# Patient Record
Sex: MALE | Race: WHITE | URBAN - METROPOLITAN AREA
[De-identification: names, ages, dates, MRNs, and addresses within clinical notes are randomized per-mention and may not be internally consistent; named-entity substitution may affect disease eponyms.]

---

## 2017-01-17 ENCOUNTER — TELEPHONE (OUTPATIENT)
Dept: GASTROENTEROLOGY | Facility: OUTPATIENT CENTER | Age: 27
End: 2017-01-17

## 2017-01-17 NOTE — TELEPHONE ENCOUNTER
Patient taking any blood thinners ? no    Heart disease ? denies    Lung disease ? denies      Sleep apnea ? denies    Diabetic ? denies    Kidney disease ? denies    Dialysis ? n/a    Electronic implanted medical devices ? denies    Are you taking any narcotic pain medication ? no  What is your daily dosage ?    PTSD ? n/a    Prep instructions reviewed with patient ? Instructions,  policy, MAC sedation plan reviewed. Advised pt. To have someone stay with him post exam    Pharmacy : n/a    Indication for procedure : Rectal bleeding    Referring provider : Carmel Ferreira

## 2017-01-20 ENCOUNTER — TRANSFERRED RECORDS (OUTPATIENT)
Dept: HEALTH INFORMATION MANAGEMENT | Facility: CLINIC | Age: 27
End: 2017-01-20

## 2017-03-17 ENCOUNTER — OFFICE VISIT (OUTPATIENT)
Dept: ONCOLOGY | Facility: CLINIC | Age: 27
End: 2017-03-17
Attending: GENETIC COUNSELOR, MS
Payer: MEDICAID

## 2017-03-17 DIAGNOSIS — D12.6 BENIGN NEOPLASM OF COLON, UNSPECIFIED PART OF COLON: Primary | ICD-10-CM

## 2017-03-17 PROCEDURE — 96040 ZZH GENETIC COUNSELING, EACH 30 MINUTES: CPT | Mod: ZF | Performed by: GENETIC COUNSELOR, MS

## 2017-03-17 NOTE — PATIENT INSTRUCTIONS
Assessing Cancer Risk  Only about 5-10% of cancers are thought to be due to an inherited cancer susceptibility gene.    These families often have:    Several people with the same or related types of cancer    Cancers diagnosed at a young age (before age 50)    Individuals with more than one primary cancer    Multiple generations of the family affected with cancer    MUTYH Associated Polyposis (MAP)  MAP is a hereditary condition that causes the formation of adenoma type polyps (typically 10 to more than 100) in the colon, the first part of the small intestine, and the stomach.  On average, individuals affected with MAP usually develop these precancerous polyps around age 50.      MUTYH  Gene  We each inherit two copies of every gene in our bodies: one from our mother, and one from our father.  Each gene has a specific job to do.  When a gene has a mistake or  mutation  in it, it does not work like it should.  Everyone has two copies of the MUTYH gene.  In order to be affected with MAP, a person must inherit a mutation in both copies of the gene.  This causes the formation of polyps and increases cancer risk.  The table below shows the chance that someone with MAP would develop cancer in their lifetime1,2.      Lifetime Cancer Risks   Colon %   Duodenal (small intestine) 5%     Studies suggest that there may also be increased risk for other later-onset cancers including ovarian and bladder cancer in individuals with MAP.  Currently, it is thought that carriers of MAP do have a colon cancer risk 2-3 times greater than the general population, but this risk does not seem to be for early-onset colon cancer.      Other Findings  Individuals with MAP may also show these features:    CHRPE: freckle like spots on the inside of the eye    Dental abnormalities/cysts on the jaw bone    Benign and malignant tumors of the skin s oil glands (sebaceous tumors)     Inheritance   MUTYH mutations are inherited in an autosomal  recessive pattern.  This means that if a both parents have a mutation, each of that couple s children will have a 25% chance of being affected with MAP.  Likewise, each child would have a 25% chance of having no MUTYH mutations and a 50% chance of having only a single mutation.  People with only one mutation are called  carriers  and are NOT affected with MAP.              Image obtained from Genetics Home Reference, 2013    Genetic Testing  Genetic testing involves a simple blood test and will look at the genetic information in the MUTYH gene for any harmful mutations that are associated with increased risk for polyps and cancer.  If possible, it is recommended that the person(s) who has had polyps or cancer be tested before other family members.  That person will give us the most useful information about whether or not a specific gene is associated with the cancer in the family.  MUTYH testing can be ordered in several ways including looking for only the two common  mutations or looking at the entire length of the gene.  Your genetic counselor can help determine which test is most appropriate for you and your family.     Results  There are three possible results of MUTYH genetic testing:    Positive--two harmful MUTYH mutations were identified    Carrier--only one harmful MUTYH mutation was identified    Negative--no MUTYH mutations were identified    Variant of unknown significance--a variation in the gene was identified, but it is unclear how this impacts cancer risk in the family    Advantages and Disadvantages  There are advantages and disadvantages to genetic testing of this gene.    Advantages    May clarify your cancer risk    Can help you make medical decisions    May explain the polyps and cancers in your family    May give useful information to your family members (if you share your results)    Disadvantages    Possible negative emotional impact of learning about inherited cancer  risk    Uncertainty in interpreting a negative test result in some situations    Possible genetic discrimination concerns (see below)        Reducing Cancer Risk  Current screening recommendations for people with MAP include2:    Colon:   o Colonoscopy starting at age 25-30; repeated every 2-3 years if no polyps are found; repeated every 1-2 years if fewer than 20 polyps are found and they are small    o Consider removal of the colon if more than 20 polyps are found at any one colonoscopy or all of the polyps cannot be removed during the colonoscopy    Duodenum and Stomach:   o Baseline upper endoscopy with side-viewing beginning at age 30-35      Even after the colon is removed, the rectum should be evaluated every 6-12 months for polyps.    Some medications are available that may reduce the number of polyps.    Guidelines do not yet encourage screening for other cancers, but skin and thyroid exams could be done as part of the annual physical exam.    Carriers of MAP (one MUTYH mutation) should have average moderate-risk colon cancer screening.     Genetic Information Nondiscrimination Act (GERONIMO)  GERONIMO is a federal law that protects individuals from health insurance or employment discrimination based on a genetic test result alone.  Although rare, there are currently no legal protections in terms of life insurance, long term care, or disability insurances.  Visit the National Human Genome Research [place] at Genome.gov/44563492 to learn more.    Questions to Think About Regarding Genetic Testing    What effect will the test result have on me and my relationship with my family members if I have an inherited gene mutation?  If I don t have a gene mutation?    Should I share my test results, and how will my family react to this news, which may also affect them?    Are my children ready to learn new information that may one day affect their own health?    Resources  Colorectal Cancer Coalition  [email or web address]   Colon Cancer [place] (CCA) ccalliance.org   American Cancer Society (ACS) cancer.org   National Cancer [place] (NCI) cancer.gov     Please call us if you have any questions or concerns.     Cancer Risk Management Program [phone]-CANCER ([phone])    ? Penny Lopez, MS, [facility] [place]  [phone]  ? Monet Malone, MS, [facility] [place]  [phone]  ? Violeta Christiano, MS, [facility] [place]  [phone]  ? Cami Arevalo, MS, [facility] [place]  [phone]    References  1. Charo SJ, Olga Campo MC, Thong IP, Coni RS. Clinical implications of the colorectal cancer risk associated with MUTYH mutation. J Clin Oncol. 2009;27:3975-80.  2. National Comprehensive Cancer Network. Clinical practice guidelines in oncology, colorectal cancer screening. Available online (registration required). 2014.    Assessing Cancer Risk  Only about 5-10% of cancers are thought to be due to an inherited cancer susceptibility gene.    These families often have:    Several people with the same or related types of cancer    Cancers diagnosed at a young age (before age 50)    Individuals with more than one primary cancer    Multiple generations of the family affected with cancer    Familial Adenomatous Polyposis (FAP)  FAP is a hereditary condition that causes the formation of many (more than 100) adenoma type polyps in the colon and increases the risk of colon cancer. Individuals affected with FAP can begin to develop these precancerous polyps in their teens. Without preventative surgery, colon cancer is almost inevitable.     Attenuated Familial Adenomatous Polyposis (AFAP)  AFAP is also a condition that causes colon polyps. It is milder than Classic FAP, however, and the lifetime risk of colon cancer is lower at approximately 70%.  Usually individuals affected with AFAP will have  adenoma type polyps.     FAP and AFAP may also be known as Moran Syndrome or Turcot Syndrome.    APC Gene  We each inherit two copies of every gene in our bodies: one  from our mother, and one from our father.  Each gene has a specific job to do.  When a gene has a mistake or  mutation  in it, it does not work like it should.  Everyone has two copies of the APC gene.  A single mutation in one of the copies of the APC gene causes FAP or AFAP.  This causes the formation of polyps and increases colon cancer risk.  The risk for other types of cancers including gastric, pancreatic, and certain brain tumors is also slightly increased.   The table below shows the chance that someone with an APC mutation would develop cancer in their lifetime1,2.     Lifetime Cancer Risks   Colon %   Duodenal 4-12%   Thyroid <2%   Liver (before age 5) 1-2%   Pancreatic <1%   Stomach <1%     Other Findings  Individuals with FAP may also show these features:    Polyps in the stomach and small bowel    CHRPE: freckle like spots on the inside of the eye    Desmoid Tumors: benign tumors typically found in the abdomen    Osteomas: benign bony tumors typically found in the skull and/or jaw    Epidermoid cysts: benign cysts found on the skin    Extra teeth or other dental abnormalities     Inheritance   APC mutations are inherited in an autosomal dominant pattern.  This means that if a parent has a mutation, each of his or her children will have a 50% chance of inheriting that same mutation.  Therefore, each child--male or female--would have a 50% chance of being at increased risk for developing colon polyps and cancer.  Up to 30% of people with an APC mutation, however, did not inherit it from a parent.  Rather, the mutation occurred de jef (for the first time) in them.  Now that they have the mutation, however, they can pass it on to their children.              Image obtained from Genetics Home Reference, 2013    Genetic Testing  Genetic testing involves a simple blood test and will look at the genetic information in the APC gene for any harmful mutations that are associated with increased risk for  polyps and cancer.  If possible, it is recommended that the person(s) who has had polyps or cancer be tested before other family members.  That person will give us the most useful information about whether or not a specific gene is associated with the cancer in the family.     Results  There are three possible results of APC genetic testing:    Positive--a harmful mutation was identified    Negative--no mutation was identified    Variant of unknown significance--a variation in the gene was identified, but it is unclear how this impacts cancer risk in the family    Advantages and Disadvantages  There are advantages and disadvantages to genetic testing of this gene.    Advantages    May clarify your cancer risk    Can help you make medical decisions    May explain the polyps and cancers in your family    May give useful information to your family members (if you share your results)    Disadvantages    Possible negative emotional impact of learning about inherited cancer risk    Uncertainty in interpreting a negative test result in some situations    Possible genetic discrimination concerns (see below)        Reducing Cancer Risk  Current screening recommendations for people with Classic FAP include1:    Colon:   o Annual colonoscopy or sigmoidoscopy starting at age 10-15 years  o Removal of the colon with continued rectal surveillance as needed    Duodenum and Stomach:   o Baseline upper endoscopy including side-viewing starting at age 25-30; repeated based on polyp findings3  o Consider adding small bowel visualization to CT or MRI done for desmoids (below)    Thyroid:    o Annual thyroid exam starting in late teens  o Consider annual thyroid ultrasound    CNS: Annual physical exam    Liver:   o Liver palpation, abdominal ultrasound, and AFP measurement; repeated every 3-6 months until age 5    Desmoids:   o Annual abdominal palpation    o Consider abdominal MRI or CT 1-3 years after colectomy; repeated every 5-10  years    Current screening recommendations for individuals with Attenuated FAP (AFAP) include1:    Colon:   o Colonoscopy starting at late teens; repeated every 2-3  o Consider removal of the colon when more than 20 or 30 adenoma polyps are found    Duodenum and Stomach:   o Baseline upper endoscopy starting at age 25-30; repeated based on findings    Thyroid:  Annual thyroid exam     CNS: Annual physical exam    Some medications are available that may reduce the number of polyps.  Osteomas, desmoids, and epidermoid cysts should be evaluated for treatment or removal.    The age at which to start and repeat screening may be modified based on personal and family history.    Genetic Information Nondiscrimination Act (GERONIMO)  GERONIMO is a federal law that protects individuals from health insurance or employment discrimination based on a genetic test result alone.  Although rare, there are currently no legal protections in terms of life insurance, long term care, or disability insurances.  Visit the National [employer] Research [place] at Genome.gov/25031237 to learn more.    Questions to Think About Regarding Genetic Testing    What effect will the test result have on me and my relationship with my family members if I have an inherited gene mutation?  If I don t have a gene mutation?    Should I share my test results, and how will my family react to this news, which may also affect them?    Are my children ready to learn new information that may one day affect their own health?    Resources  Colorectal Cancer Coalition [email or web address]   Colon Cancer [place] (CCA) [email or web address]   American Cancer Society (ACS) cancer.org   National Cancer [place] (NCI) cancer.gov     Please call us if you have any questions or concerns.     Cancer Risk Management Program [phone] ([phone])    ? Pneny Lopez MS, [facility] [place]  [phone]  ? Monet Malone MS, [facility] [place]  [phone]  ? Violeta Alvarado MS,  [facility] [place] [facility]  [phone]  ? Cami Arevalo, MS, [facility] [place] [facility]  [phone]    References  3. National Comprehensive Cancer Network. Clinical practice guidelines in oncology, colorectal cancer screening. Available online (registration required). 2014.  4. Mariola BURGESS, Tarik J, Jay KM, Zaire RA, Shelbi N, Kim J, Lorraine G, Rain MF, [place] RW. American  mutation for attenuated familial adenomatous polyposis. Clin Gastroenterol Hepatol. 2008;6:46-52.  5. National Comprehensive Cancer Network. Gastric Cancer. Available online (registration required). 2015.

## 2017-03-17 NOTE — LETTER
3/17/2017       RE: Bryan Mora  [address] [facility] [address]     Dear Colleague,    Thank you for referring your patient, Bryan Mora, to the [facility]. Please see a copy of my visit note below.    3/17/2017    Referring Provider: Braxton Almanza MD    Presenting Information:   I met with Bryan Mora today for genetic counseling at the Cancer Risk Management Program at the [facility] to discuss his personal history of colon polyps. He is here today to review this history, cancer screening recommendations, and available genetic testing options.    Personal History:  Bryan is a 26 year old male. He does not have any personal history of cancer. Bryan recently had his first colonoscopy at age 26 to address rectal bleeding. This colonoscopy in January 2017 identified one 1.4 cm sessile serrate adenoma in the ascending colon, one 0.5 cm sessile serrated adenoma in the transverse colon, and one 1.6 cm hyperplastic polyp in the sigmoid colon. Follow-up was recommended in one year. He does not regularly do any other cancer screening at this time. Bryan smoked one pack of cigarettes a day for eight years and quit in 2013. He reports very minimal alcohol use.    Family History: (Please see scanned pedigree for detailed family history information)    One maternal half sister is 40 and had less than five colon polyps of unknown type removed in her 20's. She then had annual colonoscopies for several years and no other polyps were identified. One of her daughters is experiencing gastrointestinal problems, particularly constipation, but no cause has been identified.    One maternal half sister is 44 and has no personal history of colon polyps or cancer. She reportedly carries a Factor V Leiden mutation. We reviewed autosomal dominant inheritance and that Bryan has a 25% chance to carry this mutation associated with clotting risk. Bryan is encouraged to discuss this history with  "his primary care provider.    Bryan's mother is 61 and has had several \"precancerous\" breast masses removed.     Bryan's maternal grandmother was diagnosed with breast cancer at age 60 and passed away at age 63; treatment included chemotherapy.    Bryan's paternal grandfather was diagnosed with and passed away from lung cancer in his 50's. He had a history of smoking.    His maternal ethnicity is English and [place]. His paternal ethnicity is [place], Sinhala, and [place]ian. There is no known Ashkenazi Faith ancestry on either side of his family. There is no reported consanguinity.    Discussion:    Bryan's personal history of colon polyps is not clearly suggestive of a hereditary cancer/polyposis syndrome at this time, though it is important to note his young age and the larger size of the polyps removed.    We reviewed the features of sporadic, familial, and hereditary cancers. In looking at Bryan's family history, it is still possible that a cancer susceptibility gene is present as Bryan was found to have three polyps (two of which are 1 cm/advanced polyps) at age 26. His maternal half sister may also have a similar history. It is important to note, though, that Bryan otherwise has no other relatives diagnosed with polyps/related cancers (i.e. colon, gastrointestinal cancers) and the relatives diagnosed with cancer were diagnosed at more typical ages and with cancers associated with significant environmental exposures (i.e. smoking and lung cancer).     We discussed the natural history and genetics of MUTYH-Associated Polyposis (MAP) and Familial Adenomatous Polyposis (FAP). A detailed handout regarding these syndromes and the information we discussed was provided to Bryan at the end of our appointment today and can be found in the after visit summary.  Topics included: inheritance pattern, cancer risks, cancer screening recommendations, and also risks, benefits and limitations of testing.    We reviewed that a " possible explanation for his personal history of colon polyps is MAP, which is caused by mutations in the MUTYH gene. Individuals with MAP typically have between  (or more) colon polyps and are at increased risk for colon and duodenal cancer. We reviewed that his personal history is not typical for someone with MAP, as he has only had three colon polyps. That being said, he is young for having colon polyps, which would be consistent with MAP. As he has no family history of colon cancer or polyps, this may also be consistent with the autosomal recessive inheritance of this condition (i.e. parents are carriers and are not expected to have the same colon polyp and cancer risks).    Another potential explanation could be FAP, which is caused by mutations in the APC gene. Individuals with FAP typically have hundreds to thousands of colon polyps and a nearly 100% lifetime risk for colon cancer (if left untreated). There are also increased risks for small bowel, stomach, thyroid, and brain cancers. A similar condition, called Attenuated FAP (AFAP), is also caused by mutations in the APC gene and presents with risks for the same polyps and cancers, but the risks are not typically as high. Bryan's personal and family history is not typical for FAP, but may be more consistent with AFAP given his lower polyp count (though still less likely given the number and type of polyps removed). Also, a small percentage of individuals with APC mutations are the first members of their family to carry that gene mutation, which can explain an otherwise negative family history.       We briefly reviewed that there are other hereditary polyp conditions, but they typically present with other types of colon polyps (juvenile, hamartomatous, etc.) that Bryan was not found to carry. This makes the other polyp conditions less likely.    Bryan explained that though there is a small likelihood that he has one of these conditions, he would be  interesting in genetic testing of the APC and MUTYH genes to rule out these potential cancer risks and to better understand his cancer screening recommendations.    Bryan further explained that he would prefer to obtain a pre-authorization for the genetic testing through his insurance company before having his blood drawn. As such, his insurance information will be provided to [employer] in order to initiate the pre-authorization process. Bryan will then be contacted with his expected insurance coverage and estimated out of pocket cost, when available. If he is comfortable with this coverage and cost, he will be invited back to clinic to have his blood drawn for testing. Bryan was in agreement with this plan and was provided my contact information in the event he has questions.    The information from genetic testing may determine additional cancer screening recommendations (i.e. frequent colonoscopies, upper endoscopies, etc.) as well as options for risk reducing surgeries for Bryan and his relatives. These recommendations will be discussed in detail once genetic testing is completed.    Plan:  1) Today Bryan elected to pursue a pre-authorization for genetic testing of the APC and MUTYH genes through [employer] in order to better understand his expected insurance coverage and estimated out of pocket cost.  2) Bryan will be contacted with this information, once obtained. If he is comfortable with his estimated out of pocket cost, he will be invited back to clinic for a blood draw.  3) Bryan was provided my contact information in the event he has any questions.    Face to face time: 35 minutes    Violeta Alvarado MS, [facility] [place]  Certified Genetic Counselor  Office: [phone]  Pager: [phone]

## 2017-03-17 NOTE — LETTER
Cancer Risk Management  Program Locations    [facility]  [facility]  [facility]  Mailing Address  Cancer Risk Management Program  [facility]  [address]  [place] [address]    New patient appointments  [phone]  March 20, 2017    Bryan Mora  [address] [facility] [address]      Dear Bryan,    It was a pleasure meeting with you at the [facility] on March 17, 2017. Here is a copy of the progress note from your recent genetic counseling visit to the Cancer Risk Management Program. If you have any additional questions, please feel free to call.    3/17/2017    Referring Provider: Braxton Almanza MD    Presenting Information:   I met with Bryan Mora today for genetic counseling at the Cancer Risk Management Program at the [facility] to discuss his personal history of colon polyps. He is here today to review this history, cancer screening recommendations, and available genetic testing options.    Personal History:  Bryan is a 26 year old male. He does not have any personal history of cancer. Bryan recently had his first colonoscopy at age 26 to address rectal bleeding. This colonoscopy in January 2017 identified one 1.4 cm sessile serrate adenoma in the ascending colon, one 0.5 cm sessile serrated adenoma in the transverse colon, and one 1.6 cm hyperplastic polyp in the sigmoid colon. Follow-up was recommended in one year. He does not regularly do any other cancer screening at this time. Bryan smoked one pack of cigarettes a day for eight years and quit in 2013. He reports very minimal alcohol use.    Family History: (Please see scanned pedigree for detailed family history information)    One maternal half sister is 40 and had less than five colon polyps of unknown type removed in her  "20's. She then had annual colonoscopies for several years and no other polyps were identified. One of her daughters is experiencing gastrointestinal problems, particularly constipation, but no cause has been identified.    One maternal half sister is 44 and has no personal history of colon polyps or cancer. She reportedly carries a Factor V Leiden mutation. We reviewed autosomal dominant inheritance and that Bryan has a 25% chance to carry this mutation associated with clotting risk. Bryan is encouraged to discuss this history with his primary care provider.    Bryan's mother is 61 and has had several \"precancerous\" breast masses removed.     Bryan's maternal grandmother was diagnosed with breast cancer at age 60 and passed away at age 63; treatment included chemotherapy.    Bryan's paternal grandfather was diagnosed with and passed away from lung cancer in his 50's. He had a history of smoking.    His maternal ethnicity is English and Syrian. His paternal ethnicity is Jody, Mongolian, and Romanian. There is no known Ashkenazi Jain ancestry on either side of his family. There is no reported consanguinity.    Discussion:    Bryan's personal history of colon polyps is not clearly suggestive of a hereditary cancer/polyposis syndrome at this time, though it is important to note his young age and the larger size of the polyps removed.    We reviewed the features of sporadic, familial, and hereditary cancers. In looking at Bryan's family history, it is still possible that a cancer susceptibility gene is present as Bryan was found to have three polyps (two of which are 1 cm/advanced polyps) at age 26. His maternal half sister may also have a similar history. It is important to note, though, that Bryan otherwise has no other relatives diagnosed with polyps/related cancers (i.e. colon, gastrointestinal cancers) and the relatives diagnosed with cancer were diagnosed at more typical ages and with cancers associated with " significant environmental exposures (i.e. smoking and lung cancer).     We discussed the natural history and genetics of MUTYH-Associated Polyposis (MAP) and Familial Adenomatous Polyposis (FAP). A detailed handout regarding these syndromes and the information we discussed was provided to Bryan at the end of our appointment today and can be found in the after visit summary.  Topics included: inheritance pattern, cancer risks, cancer screening recommendations, and also risks, benefits and limitations of testing.    We reviewed that a possible explanation for his personal history of colon polyps is MAP, which is caused by mutations in the MUTYH gene. Individuals with MAP typically have between  (or more) colon polyps and are at increased risk for colon and duodenal cancer. We reviewed that his personal history is not typical for someone with MAP, as he has only had three colon polyps. That being said, he is young for having colon polyps, which would be consistent with MAP. As he has no family history of colon cancer or polyps, this may also be consistent with the autosomal recessive inheritance of this condition (i.e. parents are carriers and are not expected to have the same colon polyp and cancer risks).    Another potential explanation could be FAP, which is caused by mutations in the APC gene. Individuals with FAP typically have hundreds to thousands of colon polyps and a nearly 100% lifetime risk for colon cancer (if left untreated). There are also increased risks for small bowel, stomach, thyroid, and brain cancers. A similar condition, called Attenuated FAP (AFAP), is also caused by mutations in the APC gene and presents with risks for the same polyps and cancers, but the risks are not typically as high. Bryan's personal and family history is not typical for FAP, but may be more consistent with AFAP given his lower polyp count (though still less likely given the number and type of polyps removed). Also, a  small percentage of individuals with APC mutations are the first members of their family to carry that gene mutation, which can explain an otherwise negative family history.       We briefly reviewed that there are other hereditary polyp conditions, but they typically present with other types of colon polyps (juvenile, hamartomatous, etc.) that Bryan was not found to carry. This makes the other polyp conditions less likely.    Bryan explained that though there is a small likelihood that he has one of these conditions, he would be interesting in genetic testing of the APC and MUTYH genes to rule out these potential cancer risks and to better understand his cancer screening recommendations.    Bryan further explained that he would prefer to obtain a pre-authorization for the genetic testing through his insurance company before having his blood drawn. As such, his insurance information will be provided to [employer] in order to initiate the pre-authorization process. Bryan will then be contacted with his expected insurance coverage and estimated out of pocket cost, when available. If he is comfortable with this coverage and cost, he will be invited back to clinic to have his blood drawn for testing. Bryan was in agreement with this plan and was provided my contact information in the event he has questions.    The information from genetic testing may determine additional cancer screening recommendations (i.e. frequent colonoscopies, upper endoscopies, etc.) as well as options for risk reducing surgeries for Bryan and his relatives. These recommendations will be discussed in detail once genetic testing is completed.    Plan:  1) Today Bryan elected to pursue a pre-authorization for genetic testing of the APC and MUTYH genes through [employer] in order to better understand his expected insurance coverage and estimated out of pocket cost.  2) Bryan will be contacted with this information, once obtained. If he is  comfortable with his estimated out of pocket cost, he will be invited back to clinic for a blood draw.  3) Bryan was provided my contact information in the event he has any questions.    Face to face time: 35 minutes    Violeta Alvarado MS, [facility]  Certified Genetic Counselor  Office: [phone]  Pager: [phone]

## 2017-03-17 NOTE — PROGRESS NOTES
"3/17/2017    Referring Provider: Braxton Almanza MD    Presenting Information:   I met with Bryan Mora today for genetic counseling at the Cancer Risk Management Program at the [facility] to discuss his personal history of colon polyps. He is here today to review this history, cancer screening recommendations, and available genetic testing options.    Personal History:  Bryan is a 26 year old male. He does not have any personal history of cancer. Bryan recently had his first colonoscopy at age 26 to address rectal bleeding. This colonoscopy in January 2017 identified one 1.4 cm sessile serrate adenoma in the ascending colon, one 0.5 cm sessile serrated adenoma in the transverse colon, and one 1.6 cm hyperplastic polyp in the sigmoid colon. Follow-up was recommended in one year. He does not regularly do any other cancer screening at this time. Bryan smoked one pack of cigarettes a day for eight years and quit in 2013. He reports very minimal alcohol use.    Family History: (Please see scanned pedigree for detailed family history information)    One maternal half sister is 40 and had less than five colon polyps of unknown type removed in her 20's. She then had annual colonoscopies for several years and no other polyps were identified. One of her daughters is experiencing gastrointestinal problems, particularly constipation, but no cause has been identified.    One maternal half sister is 44 and has no personal history of colon polyps or cancer. She reportedly carries a Factor V Leiden mutation. We reviewed autosomal dominant inheritance and that Bryan has a 25% chance to carry this mutation associated with clotting risk. Bryan is encouraged to discuss this history with his primary care provider.    Bryan's mother is 61 and has had several \"precancerous\" breast masses removed.     Bryan's maternal grandmother was diagnosed with breast cancer at age 60 and passed away at age 63; treatment included " chemotherapy.    Bryan's paternal grandfather was diagnosed with and passed away from lung cancer in his 50's. He had a history of smoking.    His maternal ethnicity is English and Dominican. His paternal ethnicity is Jody, Indonesian, and Micronesian. There is no known Ashkenazi Yazidism ancestry on either side of his family. There is no reported consanguinity.    Discussion:    Bryan's personal history of colon polyps is not clearly suggestive of a hereditary cancer/polyposis syndrome at this time, though it is important to note his young age and the larger size of the polyps removed.    We reviewed the features of sporadic, familial, and hereditary cancers. In looking at Bryan's family history, it is still possible that a cancer susceptibility gene is present as Bryan was found to have three polyps (two of which are 1 cm/advanced polyps) at age 26. His maternal half sister may also have a similar history. It is important to note, though, that Bryan otherwise has no other relatives diagnosed with polyps/related cancers (i.e. colon, gastrointestinal cancers) and the relatives diagnosed with cancer were diagnosed at more typical ages and with cancers associated with significant environmental exposures (i.e. smoking and lung cancer).     We discussed the natural history and genetics of MUTYH-Associated Polyposis (MAP) and Familial Adenomatous Polyposis (FAP). A detailed handout regarding these syndromes and the information we discussed was provided to Bryan at the end of our appointment today and can be found in the after visit summary.  Topics included: inheritance pattern, cancer risks, cancer screening recommendations, and also risks, benefits and limitations of testing.    We reviewed that a possible explanation for his personal history of colon polyps is MAP, which is caused by mutations in the MUTYH gene. Individuals with MAP typically have between  (or more) colon polyps and are at increased risk for colon and  duodenal cancer. We reviewed that his personal history is not typical for someone with MAP, as he has only had three colon polyps. That being said, he is young for having colon polyps, which would be consistent with MAP. As he has no family history of colon cancer or polyps, this may also be consistent with the autosomal recessive inheritance of this condition (i.e. parents are carriers and are not expected to have the same colon polyp and cancer risks).    Another potential explanation could be FAP, which is caused by mutations in the APC gene. Individuals with FAP typically have hundreds to thousands of colon polyps and a nearly 100% lifetime risk for colon cancer (if left untreated). There are also increased risks for small bowel, stomach, thyroid, and brain cancers. A similar condition, called Attenuated FAP (AFAP), is also caused by mutations in the APC gene and presents with risks for the same polyps and cancers, but the risks are not typically as high. Bryan's personal and family history is not typical for FAP, but may be more consistent with AFAP given his lower polyp count (though still less likely given the number and type of polyps removed). Also, a small percentage of individuals with APC mutations are the first members of their family to carry that gene mutation, which can explain an otherwise negative family history.       We briefly reviewed that there are other hereditary polyp conditions, but they typically present with other types of colon polyps (juvenile, hamartomatous, etc.) that Bryan was not found to carry. This makes the other polyp conditions less likely.    Bryan explained that though there is a small likelihood that he has one of these conditions, he would be interesting in genetic testing of the APC and MUTYH genes to rule out these potential cancer risks and to better understand his cancer screening recommendations.    Bryan further explained that he would prefer to obtain a  pre-authorization for the genetic testing through his insurance company before having his blood drawn. As such, his insurance information will be provided to [employer] in order to initiate the pre-authorization process. Bryan will then be contacted with his expected insurance coverage and estimated out of pocket cost, when available. If he is comfortable with this coverage and cost, he will be invited back to clinic to have his blood drawn for testing. Bryan was in agreement with this plan and was provided my contact information in the event he has questions.    The information from genetic testing may determine additional cancer screening recommendations (i.e. frequent colonoscopies, upper endoscopies, etc.) as well as options for risk reducing surgeries for Bryan and his relatives. These recommendations will be discussed in detail once genetic testing is completed.    Plan:  1) Today Bryan elected to pursue a pre-authorization for genetic testing of the APC and MUTYH genes through [employer] in order to better understand his expected insurance coverage and estimated out of pocket cost.  2) Bryan will be contacted with this information, once obtained. If he is comfortable with his estimated out of pocket cost, he will be invited back to clinic for a blood draw.  3) Bryan was provided my contact information in the event he has any questions.    Face to face time: 35 minutes    Violeta Alvarado MS, [facility]  Certified Genetic Counselor  Office: [phone]  Pager: [phone]

## 2017-03-17 NOTE — MR AVS SNAPSHOT
After Visit Summary   3/17/2017    Bryan Mora    MRN: [id number]           Patient Information     Date Of Birth          1990        Visit Information        Provider Department      3/17/2017 12:00 PM Violeta Alvarado GC;  2 [address] CONSULT [facility]        Care Instructions      Assessing Cancer Risk  Only about 5-10% of cancers are thought to be due to an inherited cancer susceptibility gene.    These families often have:    Several people with the same or related types of cancer    Cancers diagnosed at a young age (before age 50)    Individuals with more than one primary cancer    Multiple generations of the family affected with cancer    MUTYH Associated Polyposis (MAP)  MAP is a hereditary condition that causes the formation of adenoma type polyps (typically 10 to more than 100) in the colon, the first part of the small intestine, and the stomach.  On average, individuals affected with MAP usually develop these precancerous polyps around age 50.      MUTYH  Gene  We each inherit two copies of every gene in our bodies: one from our mother, and one from our father.  Each gene has a specific job to do.  When a gene has a mistake or  mutation  in it, it does not work like it should.  Everyone has two copies of the MUTYH gene.  In order to be affected with MAP, a person must inherit a mutation in both copies of the gene.  This causes the formation of polyps and increases cancer risk.  The table below shows the chance that someone with MAP would develop cancer in their lifetime1,2.      Lifetime Cancer Risks   Colon %   Duodenal (small intestine) 5%     Studies suggest that there may also be increased risk for other later-onset cancers including ovarian and bladder cancer in individuals with MAP.  Currently, it is thought that carriers of MAP do have a colon cancer risk 2-3 times greater than the general population, but this risk does not seem to be for early-onset  colon cancer.      Other Findings  Individuals with MAP may also show these features:    CHRPE: freckle like spots on the inside of the eye    Dental abnormalities/cysts on the jaw bone    Benign and malignant tumors of the skin s oil glands (sebaceous tumors)     Inheritance   MUTYH mutations are inherited in an autosomal recessive pattern.  This means that if a both parents have a mutation, each of that couple s children will have a 25% chance of being affected with MAP.  Likewise, each child would have a 25% chance of having no MUTYH mutations and a 50% chance of having only a single mutation.  People with only one mutation are called  carriers  and are NOT affected with MAP.              Image obtained from Genetics Home Reference, 2013    Genetic Testing  Genetic testing involves a simple blood test and will look at the genetic information in the MUTYH gene for any harmful mutations that are associated with increased risk for polyps and cancer.  If possible, it is recommended that the person(s) who has had polyps or cancer be tested before other family members.  That person will give us the most useful information about whether or not a specific gene is associated with the cancer in the family.  MUTYH testing can be ordered in several ways including looking for only the two common  mutations or looking at the entire length of the gene.  Your genetic counselor can help determine which test is most appropriate for you and your family.     Results  There are three possible results of MUTYH genetic testing:    Positive--two harmful MUTYH mutations were identified    Carrier--only one harmful MUTYH mutation was identified    Negative--no MUTYH mutations were identified    Variant of unknown significance--a variation in the gene was identified, but it is unclear how this impacts cancer risk in the family    Advantages and Disadvantages  There are advantages and disadvantages to genetic testing of this gene.     Advantages    May clarify your cancer risk    Can help you make medical decisions    May explain the polyps and cancers in your family    May give useful information to your family members (if you share your results)    Disadvantages    Possible negative emotional impact of learning about inherited cancer risk    Uncertainty in interpreting a negative test result in some situations    Possible genetic discrimination concerns (see below)        Reducing Cancer Risk  Current screening recommendations for people with MAP include2:    Colon:   o Colonoscopy starting at age 25-30; repeated every 2-3 years if no polyps are found; repeated every 1-2 years if fewer than 20 polyps are found and they are small    o Consider removal of the colon if more than 20 polyps are found at any one colonoscopy or all of the polyps cannot be removed during the colonoscopy    Duodenum and Stomach:   o Baseline upper endoscopy with side-viewing beginning at age 30-35      Even after the colon is removed, the rectum should be evaluated every 6-12 months for polyps.    Some medications are available that may reduce the number of polyps.    Guidelines do not yet encourage screening for other cancers, but skin and thyroid exams could be done as part of the annual physical exam.    Carriers of MAP (one MUTYH mutation) should have average moderate-risk colon cancer screening.     Genetic Information Nondiscrimination Act (GERONIMO)  GERONIMO is a federal law that protects individuals from health insurance or employment discrimination based on a genetic test result alone.  Although rare, there are currently no legal protections in terms of life insurance, long term care, or disability insurances.  Visit the National Human Genome Research [place] at Genome.gov/87586566 to learn more.    Questions to Think About Regarding Genetic Testing    What effect will the test result have on me and my relationship with my family members if I have an inherited  gene mutation?  If I don t have a gene mutation?    Should I share my test results, and how will my family react to this news, which may also affect them?    Are my children ready to learn new information that may one day affect their own health?    Resources  Colorectal Cancer Coalition [email or web address]   Colon Cancer [place] (CCA) ccalliance.org   American Cancer Society (ACS) cancer.org   National Cancer [place] (NCI) cancer.gov     Please call us if you have any questions or concerns.     Cancer Risk Management Program [phone] ([phone])    ? Penny Lopez, MS, [facility]  [phone]  ? Monet Kira, MS, [facility]  [phone]  ? Violeta Alvarado, MS, [facility]  [phone]  ? Cami Arevalo, MS, [facility]  [phone]    References  1. Charo SJ, Olga Campo MC, Thong IP, Coni RS. Clinical implications of the colorectal cancer risk associated with MUTYH mutation. J Clin Oncol. 2009;27:3975-80.  2. National Comprehensive Cancer Network. Clinical practice guidelines in oncology, colorectal cancer screening. Available online (registration required). 2014.    Assessing Cancer Risk  Only about 5-10% of cancers are thought to be due to an inherited cancer susceptibility gene.    These families often have:    Several people with the same or related types of cancer    Cancers diagnosed at a young age (before age 50)    Individuals with more than one primary cancer    Multiple generations of the family affected with cancer    Familial Adenomatous Polyposis (FAP)  FAP is a hereditary condition that causes the formation of many (more than 100) adenoma type polyps in the colon and increases the risk of colon cancer. Individuals affected with FAP can begin to develop these precancerous polyps in their teens. Without preventative surgery, colon cancer is almost inevitable.     Attenuated Familial Adenomatous Polyposis (AFAP)  AFAP is also a condition that causes colon polyps. It is milder than Classic FAP, however, and  the lifetime risk of colon cancer is lower at approximately 70%.  Usually individuals affected with AFAP will have  adenoma type polyps.     FAP and AFAP may also be known as Moran Syndrome or Turcot Syndrome.    APC Gene  We each inherit two copies of every gene in our bodies: one from our mother, and one from our father.  Each gene has a specific job to do.  When a gene has a mistake or  mutation  in it, it does not work like it should.  Everyone has two copies of the APC gene.  A single mutation in one of the copies of the APC gene causes FAP or AFAP.  This causes the formation of polyps and increases colon cancer risk.  The risk for other types of cancers including gastric, pancreatic, and certain brain tumors is also slightly increased.   The table below shows the chance that someone with an APC mutation would develop cancer in their lifetime1,2.     Lifetime Cancer Risks   Colon %   Duodenal 4-12%   Thyroid <2%   Liver (before age 5) 1-2%   Pancreatic <1%   Stomach <1%     Other Findings  Individuals with FAP may also show these features:    Polyps in the stomach and small bowel    CHRPE: freckle like spots on the inside of the eye    Desmoid Tumors: benign tumors typically found in the abdomen    Osteomas: benign bony tumors typically found in the skull and/or jaw    Epidermoid cysts: benign cysts found on the skin    Extra teeth or other dental abnormalities     Inheritance   APC mutations are inherited in an autosomal dominant pattern.  This means that if a parent has a mutation, each of his or her children will have a 50% chance of inheriting that same mutation.  Therefore, each child--male or female--would have a 50% chance of being at increased risk for developing colon polyps and cancer.  Up to 30% of people with an APC mutation, however, did not inherit it from a parent.  Rather, the mutation occurred de jef (for the first time) in them.  Now that they have the mutation, however, they  can pass it on to their children.              Image obtained from Genetics Home Reference, 2013    Genetic Testing  Genetic testing involves a simple blood test and will look at the genetic information in the APC gene for any harmful mutations that are associated with increased risk for polyps and cancer.  If possible, it is recommended that the person(s) who has had polyps or cancer be tested before other family members.  That person will give us the most useful information about whether or not a specific gene is associated with the cancer in the family.     Results  There are three possible results of APC genetic testing:    Positive--a harmful mutation was identified    Negative--no mutation was identified    Variant of unknown significance--a variation in the gene was identified, but it is unclear how this impacts cancer risk in the family    Advantages and Disadvantages  There are advantages and disadvantages to genetic testing of this gene.    Advantages    May clarify your cancer risk    Can help you make medical decisions    May explain the polyps and cancers in your family    May give useful information to your family members (if you share your results)    Disadvantages    Possible negative emotional impact of learning about inherited cancer risk    Uncertainty in interpreting a negative test result in some situations    Possible genetic discrimination concerns (see below)        Reducing Cancer Risk  Current screening recommendations for people with Classic FAP include1:    Colon:   o Annual colonoscopy or sigmoidoscopy starting at age 10-15 years  o Removal of the colon with continued rectal surveillance as needed    Duodenum and Stomach:   o Baseline upper endoscopy including side-viewing starting at age 25-30; repeated based on polyp findings3  o Consider adding small bowel visualization to CT or MRI done for desmoids (below)    Thyroid:    o Annual thyroid exam starting in late teens  o Consider  annual thyroid ultrasound    CNS: Annual physical exam    Liver:   o Liver palpation, abdominal ultrasound, and AFP measurement; repeated every 3-6 months until age 5    Desmoids:   o Annual abdominal palpation    o Consider abdominal MRI or CT 1-3 years after colectomy; repeated every 5-10 years    Current screening recommendations for individuals with Attenuated FAP (AFAP) include1:    Colon:   o Colonoscopy starting at late teens; repeated every 2-3  o Consider removal of the colon when more than 20 or 30 adenoma polyps are found    Duodenum and Stomach:   o Baseline upper endoscopy starting at age 25-30; repeated based on findings    Thyroid:  Annual thyroid exam     CNS: Annual physical exam    Some medications are available that may reduce the number of polyps.  Osteomas, desmoids, and epidermoid cysts should be evaluated for treatment or removal.    The age at which to start and repeat screening may be modified based on personal and family history.    Genetic Information Nondiscrimination Act (GERONIMO)  GERONIMO is a federal law that protects individuals from health insurance or employment discrimination based on a genetic test result alone.  Although rare, there are currently no legal protections in terms of life insurance, long term care, or disability insurances.  Visit the National Human Genome Research [place] at Genome.gov/50174286 to learn more.    Questions to Think About Regarding Genetic Testing    What effect will the test result have on me and my relationship with my family members if I have an inherited gene mutation?  If I don t have a gene mutation?    Should I share my test results, and how will my family react to this news, which may also affect them?    Are my children ready to learn new information that may one day affect their own health?    Resources  Colorectal Cancer Coalition [email or web address]   Colon Cancer [place] (CCA) ccalliance.org   American Cancer Society (ACS) cancer.org    National Cancer [place] (NCI) cancer.gov     Please call us if you have any questions or concerns.     Cancer Risk Management Program [phone] ([phone])    ? Penny Lopez, MS, [facility]  [phone]  ? Monet Malone, MS, [facility]  [phone]  ? Violeta Alvarado, MS, [facility]  [phone]  ? Cami Arevalo, MS, [facility]  [phone]    References  3. National Comprehensive Cancer Network. Clinical practice guidelines in oncology, colorectal cancer screening. Available online (registration required). 2014.  4. Mariola DW, Tarik J, Jay KM, Zaire RA, Matsjerel N, Kim J, Lorraine G, Rain MF, [place] RW. American  mutation for attenuated familial adenomatous polyposis. Clin Gastroenterol Hepatol. 2008;6:46-52.  5. National Comprehensive Cancer Network. Gastric Cancer. Available online (registration required). 2015.        Follow-ups after your visit        Your next 10 appointments already scheduled     Mar 17, 2017  1:15 PM T   [facility] Lab Draw with  [facility] LAB DRAW   [facility] Draw ([facility])    [address] [address] [phone]              Who to contact     If you have questions or need follow up information about today's clinic visit or your schedule please contact [facility] directly at [phone].  Normal or non-critical lab and imaging results will be communicated to you by MyChart, letter or phone within 4 business days after the clinic has received the results. If you do not hear from us within 7 days, please contact the clinic through MyChart or phone. If you have a critical or abnormal lab result, we will notify you by phone as soon as possible.  Submit refill requests through [employer] or call your pharmacy and they will forward the refill request to us. Please allow 3 business days for your refill to be completed.          Additional Information About Your Visit        MyChart Information      [email or web address] lets you send messages to your doctor, view your test results, renew your prescriptions, schedule appointments and more. To sign up, go to [email or web address] . Click on \"Log in\" on the left side of the screen, which will take you to the Welcome page. Then click on \"Sign up Now\" on the right side of the page.     You will be asked to enter the access code listed below, as well as some personal information. Please follow the directions to create your username and password.     Your access code is: [email or web address]  Expires: 6/15/2017 12:48 PM     Your access code will  in 90 days. If you need help or a new code, please call your [place] clinic or [phone].        Care EveryWhere ID     This is your [facility] EveryWhere ID. This could be used by other organizations to access your [place] medical records  [id number]         Blood Pressure from Last 3 Encounters:   16 126/77   16 118/70   09/19/15 116/74    Weight from Last 3 Encounters:   16 77.2 kg (170 lb 1.6 oz)   16 75.9 kg (167 lb 4 oz)   09/19/15 71.7 kg (158 lb)              Today, you had the following     No orders found for display       Primary Care Provider Office Phone # Fax #    Chiqui Wang [phone]       [facility] [address] [place] [id number]        Thank you!     Thank you for choosing [facility]  for your care. Our goal is always to provide you with excellent care. Hearing back from our patients is one way we can continue to improve our services. Please take a few minutes to complete the written survey that you may receive in the mail after your visit with us. Thank you!             Your Updated Medication List - Protect others around you: Learn how to safely use, store and throw away your medicines at www.disposemymeds.org.      Notice  As of 3/17/2017 12:48 PM    You have not been prescribed any medications.      "

## 2017-10-05 ENCOUNTER — OFFICE VISIT (OUTPATIENT)
Dept: SLEEP MEDICINE | Facility: CLINIC | Age: 27
End: 2017-10-05
Attending: INTERNAL MEDICINE
Payer: COMMERCIAL

## 2017-10-05 VITALS
RESPIRATION RATE: 16 BRPM | OXYGEN SATURATION: 99 % | HEIGHT: 68 IN | BODY MASS INDEX: 27.43 KG/M2 | WEIGHT: 181 LBS | HEART RATE: 65 BPM | SYSTOLIC BLOOD PRESSURE: 137 MMHG | DIASTOLIC BLOOD PRESSURE: 79 MMHG

## 2017-10-05 DIAGNOSIS — R06.83 SNORING: Primary | ICD-10-CM

## 2017-10-05 DIAGNOSIS — G47.21 DELAYED SLEEP PHASE SYNDROME: ICD-10-CM

## 2017-10-05 PROCEDURE — 99211 OFF/OP EST MAY X REQ PHY/QHP: CPT | Mod: ZF

## 2017-10-05 RX ORDER — CYCLOBENZAPRINE HCL 10 MG
TABLET ORAL
Refills: 4 | COMMUNITY
Start: 2017-08-18

## 2017-10-05 NOTE — PATIENT INSTRUCTIONS
Each of us has a built in tendency to find it easier to stay up late at night or get up early in the morning.  Being a  night owl  or  early bird  is a function of our internal body clock.  When you are forced to keep a sleep schedule that goes against your typical habits (because a job or other responsibilities) insomnia can be the result.  Try the following changes to see if you can improve your sleep patterns:    Pick a sleep and wake schedule with about 7-8 hours in bed that is consistent.  That means keep the same bedtime and rise time every day of the week including the weekends, for the next 4-6 weeks, for example, go to bed at 11 and get out of bed at 6:30    Try to get outside for about 30 minutes after you get up in the morning.    Sunlight is the best way to  set  your internal body clock      Take melatonin   1 mg about 5 hours before bedtime or about 6 PM.     Change the timing of sleep onset and wake time with melatonin and light time over next few weeks.

## 2017-10-05 NOTE — MR AVS SNAPSHOT
After Visit Summary   10/5/2017    Bryan Mora    MRN: [id number]           Patient Information     Date Of Birth          1990        Visit Information        Provider Department      10/5/2017 2:30 PM [facility]        Care Instructions    Each of us has a built in tendency to find it easier to stay up late at night or get up early in the morning.  Being a  night owl  or  early bird  is a function of our internal body clock.  When you are forced to keep a sleep schedule that goes against your typical habits (because a job or other responsibilities) insomnia can be the result.  Try the following changes to see if you can improve your sleep patterns:    Pick a sleep and wake schedule with about 7-8 hours in bed that is consistent.  That means keep the same bedtime and rise time every day of the week including the weekends, for the next 4-6 weeks, for example, go to bed at 11 and get out of bed at 6:30    Try to get outside for about 30 minutes after you get up in the morning.    Sunlight is the best way to  set  your internal body clock      Take melatonin - 1 mg about 5 hours before bedtime or about 6 PM.     Change the timing of sleep onset and wake time with melatonin and light time over next few weeks.                 Follow-ups after your visit        Follow-up notes from your care team     Return if symptoms worsen or fail to improve.      Who to contact     If you have questions or need follow up information about today's clinic visit or your schedule please contact [facility] directly at [phone].  Normal or non-critical lab and imaging results will be communicated to you by MyChart, letter or phone within 4 business days after the clinic has received the results. If you do not hear from us within 7 days, please contact the clinic through MyChart or phone. If you have a critical or abnormal lab result, we will notify you by phone  "as soon as possible.  Submit refill requests through Publicfast or call your pharmacy and they will forward the refill request to us. Please allow 3 business days for your refill to be completed.          Additional Information About Your Visit        [employer] [facility] Information     Publicfast lets you send messages to your doctor, view your test results, renew your prescriptions, schedule appointments and more. To sign up, go to [email or web address] . Click on \"Log in\" on the left side of the screen, which will take you to the Welcome page. Then click on \"Sign up Now\" on the right side of the page.     You will be asked to enter the access code listed below, as well as some personal information. Please follow the directions to create your username and password.     Your access code is: [email or web address]  Expires: 1/3/2018  3:35 PM     Your access code will  in 90 days. If you need help or a new code, please call your [place] clinic or [phone].        Care EveryWhere ID     This is your Care EveryWhere ID. This could be used by other organizations to access your [place] medical records  [id number]        Your Vitals Were     Pulse Respirations Height Pulse Oximetry BMI (Body Mass Index)       65 16 1.727 m (5' 8\") 99% 27.52 kg/m2        Blood Pressure from Last 3 Encounters:   10/05/17 137/79   16 126/77   16 118/70    Weight from Last 3 Encounters:   10/05/17 82.1 kg (181 lb)   16 77.2 kg (170 lb 1.6 oz)   16 75.9 kg (167 lb 4 oz)              Today, you had the following     No orders found for display       Primary Care Provider Office Phone # Fax #    Chiqui Wang [phone] [address] [facility] [address]        Equal Access to Services     DERIC BURNHAM : Nikki Barriga, meera hensley, marcella hunter. [facility] [phone].    ATENCIÓN: Si isabela riddle, tiene a villalta disposición servicios " aj de asistencia lingüística. Lamonte greco [phone].    We comply with applicable federal civil rights laws and [place] laws. We do not discriminate on the basis of race, color, national origin, age, disability, sex, sexual orientation, or gender identity.            Thank you!     Thank you for choosing [facility]EP ST[facility]  for your care. Our goal is always to provide you with excellent care. Hearing back from our patients is one way we can continue to improve our services. Please take a few minutes to complete the written survey that you may receive in the mail after your visit with us. Thank you!             Your Updated Medication List - Protect others around you: Learn how to safely use, store and throw away your medicines at www.disposemymeds.org.          This list is accurate as of: 10/5/17  3:35 PM.  Always use your most recent med list.                   Brand Name Dispense Instructions for use Diagnosis    cyclobenzaprine 10 MG tablet    FLEXERIL          NAPROXEN PO      Take 500 mg by mouth        sertraline 50 MG tablet    ZOLOFT     TK 1 T PO QD

## 2017-10-06 NOTE — PROGRESS NOTES
SLEEP MEDICINE CLINIC NOTE   [facility]ORDER [facility]  Bryan Mora 27 year old male  : 1990  MRN: [id number]      PRIMARY CARE PROVIDER: Chiqui Wang    REFERRING PROVIDER: No referring provider defined for this encounter.    DATE OF SERVICE:  10/05/2017      REASON FOR VISIT:  Snoring and daytime fatigue.      HISTORY OF PRESENT ILLNESS:  Bryan Mora is a very pleasant 27-year-old gentleman with a history of depression, anxiety and PTSD who is being evaluated for snoring and daytime fatigue.  The patient has experienced these symptoms for the last 1 year.  He describes being excessively sleepy during the day with an Austin sleepiness score of 10/24 with no chances of falling asleep while driving.  He describes his current sleep-wake schedule as going to bed between 11:00 p.m. and midnight.  It usually takes him a few minutes to fall asleep.  He reports waking up briefly once or twice through the night.  He does not leave the bed during these and is easy for him to fall asleep again.  He finally wakes up around 8:00 a.m. with the help of an alarm.  He does not feel rested and has significant sleep inertia.  On weekends he goes to bed around 1:00-2:00 a.m. and wakes up spontaneously between 9:00 and 10:00 a.m.  Usually drinks 1-3 cups of coffee in the morning.  He denies taking naps during the day.  His preferred sleep time is 2:00 a.m. to 11:00 a.m.      He denies any features of motor restlessness suggestive of restless legs syndrome.  He does have lower backache due to herniated disk, but this is mild and does not interfere with his sleep.  He denies any frequent dreams or nightmares or dream enactment behavior.  He does not clench his teeth in his sleep.      He denies waking up with a headache in the morning.  He reports snoring which is very loud in intensity and frequent occurring every night.  He does have snort arousals about 3 days a week, but does not have witnessed apneas.   "He does not have any difficulty breathing through his nose and does not frequently wake up with a dry mouth.  There is no history of GERD.  The patient prefers to sleep on his back or his sides.  He denies any features of hypocretin deficiency including sleep paralysis, hypnagogic or hypnopompic hallucinations or cataplexy.      FAMILY HISTORY:  Reports that his mother snores.  His sister had colon polyps.  No other medical issues in the family.      SOCIAL HISTORY:  The patient is , lives at home with his wife.  He is currently a full-time student but also working part-time as a medication technician.  He denies current smoking, used to smoke 1 pack per day for 8 years, quit in 2013.  He reports very occasional alcohol use, denies any illicit drug use.      PAST SURGICAL HISTORY:  Includes adenoidectomy in 1995, appendicectomy in 2004.      PAST MEDICAL HISTORY INCLUDES:     1.  Depression.     2.  Anxiety.    3.  PTSD.      MEDICATIONS INCLUDE:   1.  Sertraline 50 mg daily.   2.  Naprosyn 500 mg as needed.      REVIEW OF SYSTEMS:  He currently has some sore throat and sinus pain.  Otherwise, the rest of the review of system is negative except as noted above.      PHYSICAL EXAMINATION:   /79  Pulse 65  Resp 16  Ht 1.727 m (5' 8\")  Wt 82.1 kg (181 lb)  SpO2 99%  BMI 27.52 kg/m2  GENERAL:  BMI 27.5 kg/m2.  Pleasant gentleman sitting comfortably at rest, no discomfort.   HEENT:  Mallampati class III airway with a large tongue and prominent peripheral ridging.  Neck circumference 14 inches.  No obstruction to flow in the nares.  No malocclusion noted.   PULMONARY:  Normal intensity breath sounds bilaterally with no added sounds.   CARDIOVASCULAR:  S1, S2 normal with no murmur, rubs or gallops.   ABDOMEN:  Soft, nontender, nondistended, normoactive bowel sounds.   MUSCULOSKELETAL:  No upper extremity tremors.  No lower extremity edema.   PSYCHIATRIC:  Normal affect.   NEUROLOGIC:  Grossly intact. "   SKIN:  No rashes on limited examination.      ASSESSMENT AND PLAN:  The patient is a very pleasant 27-year-old gentleman with a history of mood disorder who is being evaluated for potential sleep apnea given concerns for snoring and daytime fatigue.      1.  Sleep disordered breathing.  The patient has very low pretest probability for sleep apnea.  His risk factors include male gender and crowded airway.  However, his symptoms are snoring and daytime fatigue.  Overall, his pretest probability for sleep disordered breathing is very low.  We discussed the pathophysiology of OMAR in detail as well as its various treatment options.  At this time, I suggested that we do not need to proceed with further evaluation.  However, in case he gained significant amount of weight or his symptoms of fatigue do not improve over time, then we can consider this diagnosis.  The patient is agreeable to this plan.        2.  Delayed sleep phase syndrome.  The patient has significant delayed sleep phase syndrome which is resulting in excessive daytime fatigue as well as nonrestorative sleep.  We discussed the pathophysiology of circadian rhythm disorders as well as ways to realign his circadian rhythm.  The patient was advised to use bright light or expose himself to daylight for about half an hour in the morning upon awakening and take 1 mg of melatonin 5 hours prior to intended sleep time.  He was advised to get 7-8 hours of sleep on a consistent basis and he was also advised to follow the same schedule every single day.  He was advised to change the timing of his melatonin administration and light exposure by half an hour every few weeks until the desired sleep onset and offset time is reached.      The patient was advised not to drive if drowsy or sleepy.  He will be seen in clinic on an as needed basis.      I spent a total of 45 minutes face to face with Bryan Mora during today's office visit. Over 50% of this time was spent  counseling the patient and/or coordinating care regarding their sleep disorder.     Magdiel Nuñez MD   of Medicine  Pulmonary, Critical Care and Sleep Medicine  [facility]  Pager: [phone]              D: 10/05/2017 17:09   T: 10/05/2017 23:37   MT:       Name:     FLORIN MAY   MRN:      [id number]        Account:      [id number]   :      1990           Visit Date:   10/05/2017      Document: [id number]

## 2017-12-08 DIAGNOSIS — K92.1 HEMATOCHEZIA: Primary | ICD-10-CM

## 2018-01-04 ENCOUNTER — TELEPHONE (OUTPATIENT)
Dept: GASTROENTEROLOGY | Facility: OUTPATIENT CENTER | Age: 28
End: 2018-01-04

## 2018-01-04 NOTE — TELEPHONE ENCOUNTER
Patient taking any blood thinners ? Naproxen prn    Heart disease ? denies    Lung disease ?denies      Sleep apnea ?denies    Diabetic ?denies    Kidney disease ?denies    Dialysis ? n/a    Electronic implanted medical devices ?denies    Are you taking any narcotic pain medication ? no  What is your daily dosage ?    PTSD ? n/a    Prep instructions reviewed with patient ? Patient declined review.  policy, MAC sedation plan reviewed. Advised patient have someone stay with him post exam    Pharmacy : n/a    Indication for procedure :   Hematochezia [K92.1]            Referring provider :Braxton Almanza MD     Arrival Time : Instructed patient to arrive at 12 PM

## 2018-01-11 ENCOUNTER — DOCUMENTATION ONLY (OUTPATIENT)
Dept: GASTROENTEROLOGY | Facility: OUTPATIENT CENTER | Age: 28
End: 2018-01-11
Payer: COMMERCIAL

## 2018-01-11 ENCOUNTER — TRANSFERRED RECORDS (OUTPATIENT)
Dept: HEALTH INFORMATION MANAGEMENT | Facility: CLINIC | Age: 28
End: 2018-01-11

## 2018-01-15 LAB — COPATH REPORT: NORMAL

## 2018-11-12 ENCOUNTER — OFFICE VISIT - HEALTHEAST (OUTPATIENT)
Dept: FAMILY MEDICINE | Facility: CLINIC | Age: 28
End: 2018-11-12

## 2018-11-12 DIAGNOSIS — J01.80 ACUTE NON-RECURRENT SINUSITIS OF OTHER SINUS: ICD-10-CM

## 2018-11-12 DIAGNOSIS — R05.9 COUGH: ICD-10-CM

## 2018-11-21 ENCOUNTER — AMBULATORY - HEALTHEAST (OUTPATIENT)
Dept: NURSING | Facility: CLINIC | Age: 28
End: 2018-11-21

## 2018-11-21 DIAGNOSIS — Z00.00 HEALTHCARE MAINTENANCE: ICD-10-CM

## 2021-06-02 VITALS — BODY MASS INDEX: 25.95 KG/M2 | WEIGHT: 170.7 LBS

## 2021-06-26 NOTE — PROGRESS NOTES
Progress Notes by Felipe Arriola DO at 11/12/2018  9:50 AM     Author: Felipe Arriola DO Service: -- Author Type: Physician    Filed: 11/13/2018 10:15 PM Encounter Date: 11/12/2018 Status: Signed    : Felipe Arriola DO (Physician)       Chief Complaint   Patient presents with   ? Cough     x 10 days only occurs at night and early morning . mornings productive of yellow brown secretions.  getting worse. no sleeping because when lays down cough begins.  Does not believe has had any fevers but has not checked.         History of Present Illness: Rooming staff notes reviewed.  The concern patient is a persistent cough.  Patient has a tickle sensation in throat during the day. Coughing is keeping him up at night. No recent fever.  He does not feel like he is struggling to breathe coughing.    Review of systems: See history of present illness, otherwise negative.     Current Outpatient Medications   Medication Sig Dispense Refill   ? amoxicillin-clavulanate (AUGMENTIN) 875-125 mg per tablet Take 1 tablet by mouth 2 (two) times a day for 10 days. 20 tablet 0   ? codeine-guaiFENesin (CODEINE-GUAIFENESIN)  mg/5 mL liquid Take 5 mL by mouth 4 (four) times a day as needed for cough. 60 mL 0     No current facility-administered medications for this visit.      No past medical history on file.   No past surgical history on file.   Social History     Tobacco Use   ? Smoking status: Former Smoker     Types: Cigarettes   ? Smokeless tobacco: Never Used   Substance Use Topics   ? Alcohol use: Not on file   ? Drug use: Not on file        No family history on file.    Vitals:    11/12/18 1049   BP: 104/62   Patient Site: Right Arm   Patient Position: Sitting   Cuff Size: Adult Regular   Pulse: 60   Resp: 20   Temp: 98.4  F (36.9  C)   TempSrc: Oral   SpO2: 99%   Weight: 170 lb 11.2 oz (77.4 kg)       EXAM:   General: Vital signs reviewed.  Patient is in no acute appearing distress except for occasional cough.  Breathing  appears nonlabored.  Patient is alert and oriented ×3.  ENT: Ear exam shows moderate bilateral TM dullness and injection, nasal turbinates are injected and edematous, mild pharyngeal injection with increased post nasal drainage noted.  Neck: supple with no adenoapthy.  Heart: Heart rate is regular without murmur.  Lungs: Lungs are clear to auscultation with good airflow bilaterally.  Skin: Skin is warm and dry without any rash noted.    Assessment/Plan   1. Cough  codeine-guaiFENesin (CODEINE-GUAIFENESIN)  mg/5 mL liquid    DISCONTINUED: codeine-guaiFENesin (CODEINE-GUAIFENESIN)  mg/5 mL liquid   2. Acute non-recurrent sinusitis of other sinus  amoxicillin-clavulanate (AUGMENTIN) 875-125 mg per tablet       Patient Instructions   Be seen again or call clinic in 3-4 days if symptoms are not better, sooner if feeling any worse. I think much of your cough is due to nasal drainage.      Patient Education     Acute Sinusitis    Acute sinusitis is irritation and swelling of the sinuses. It is usually caused by a viral infection after a common cold. Your doctor can help you find relief.  What is acute sinusitis?  Sinuses are air-filled spaces in the skull behind the face. They are kept moist and clean by a lining of mucosa. Things such as pollen, smoke, and chemical fumes can irritate the mucosa. It can then swell up. As a response to irritation, the mucosa makes more mucus and other fluids. Tiny hairlike cilia cover the mucosa. Cilia help carry mucus toward the opening of the sinus. Too much mucus may cause the cilia to stop working. This blocks the sinus opening. A buildup of fluid in the sinuses then causes pain and pressure. It can also encourage bacteria to grow in the sinuses.  Common symptoms of acute sinusitis  You may have:    Facial soreness pain    Headache    Fever    Fluid draining in the back of the throat (postnasal drip)    Congestion    Drainage that is thick and colored, instead of  clear    Cough  Diagnosing acute sinusitis  Your doctor will ask about your symptoms and health history. He or she will look at your ear, nose, and throat. You usually won't need to have X-rays taken.    The doctor may take a sample of mucus to check for bacteria. If you have sinusitis that keeps coming back, you may need imaging tests such as X-rays or CAT scans. This will help your doctor check for a structural problem that may be causing the infection.  Treating acute sinusitis  Treatment is aimed at unblocking the sinus opening and helping the cilia work again. You may need to take antihistamine and decongestant medicine. These can reduce inflammation and decrease the amount of fluid your sinuses make. If you have a bacterial infection, you will need to take antibiotic medicine for 10 to 14 days. Take this medicine until it is gone, even if you feel better.  Date Last Reviewed: 10/1/2016    [id number] [employer]. [address] [place] [address], [place] [address]. All rights reserved. This information is not intended as a substitute for professional medical care. Always follow your healthcare professional's instructions.              Felipe Arriola,